# Patient Record
(demographics unavailable — no encounter records)

---

## 2025-04-25 NOTE — DISCUSSION/SUMMARY
[EKG obtained to assist in diagnosis and management of assessed problem(s)] : EKG obtained to assist in diagnosis and management of assessed problem(s) [FreeTextEntry1] : EKG performed today was unremarkable.  HTN: BP in the office today is 158/90 mmHg. BP is better controlled at home. There are no changes in medication management. Continue current medical therapy. Other planned treatments include an exercise regimen, weight loss, low sodium diet, and alcohol moderation.  Lab requisition provided to check A1CG, CBC, CMP, Lipid Profile, TSH, and UA.  Instructed to follow up in 6 months.  Plan was discussed with the patient.

## 2025-04-25 NOTE — CARDIOLOGY SUMMARY
[de-identified] : 04/25/2025: NSR, normal axis, normal intervals, (-) ST-T wave changes. 03/11/2024: NSR, normal axis, normal intervals, (-) ST-T wave changes. =======================================================

## 2025-04-25 NOTE — HISTORY OF PRESENT ILLNESS
[FreeTextEntry1] : CLEO MARTINEZ is an 81-year-old female, with a PMHx significant for HTN, who presents today for a follow-up visit. BP is better controlled at home. Denies any new complaints. Reports she is feeling well. Otherwise: (-) chest pain, (-) SOB.